# Patient Record
Sex: MALE | Race: WHITE | NOT HISPANIC OR LATINO | Employment: OTHER | RURAL
[De-identification: names, ages, dates, MRNs, and addresses within clinical notes are randomized per-mention and may not be internally consistent; named-entity substitution may affect disease eponyms.]

---

## 2021-10-13 ENCOUNTER — OFFICE VISIT (OUTPATIENT)
Dept: FAMILY MEDICINE | Facility: CLINIC | Age: 41
End: 2021-10-13
Payer: MEDICAID

## 2021-10-13 VITALS
TEMPERATURE: 97 F | SYSTOLIC BLOOD PRESSURE: 180 MMHG | WEIGHT: 240 LBS | OXYGEN SATURATION: 95 % | HEIGHT: 69 IN | HEART RATE: 103 BPM | DIASTOLIC BLOOD PRESSURE: 120 MMHG | BODY MASS INDEX: 35.55 KG/M2

## 2021-10-13 DIAGNOSIS — I10 PRIMARY HYPERTENSION: Primary | ICD-10-CM

## 2021-10-13 DIAGNOSIS — Z11.52 ENCOUNTER FOR SCREENING LABORATORY TESTING FOR COVID-19 VIRUS: ICD-10-CM

## 2021-10-13 LAB
CTP QC/QA: YES
SARS-COV-2 AG RESP QL IA.RAPID: NEGATIVE

## 2021-10-13 PROCEDURE — 87426 SARSCOV CORONAVIRUS AG IA: CPT | Mod: RHCUB | Performed by: FAMILY MEDICINE

## 2021-10-13 PROCEDURE — 99203 OFFICE O/P NEW LOW 30 MIN: CPT | Mod: ,,, | Performed by: FAMILY MEDICINE

## 2021-10-13 PROCEDURE — 99203 PR OFFICE/OUTPT VISIT, NEW, LEVL III, 30-44 MIN: ICD-10-PCS | Mod: ,,, | Performed by: FAMILY MEDICINE

## 2021-10-13 RX ORDER — LISINOPRIL 10 MG/1
10 TABLET ORAL DAILY
Qty: 30 TABLET | Refills: 5 | Status: SHIPPED | OUTPATIENT
Start: 2021-10-13 | End: 2022-02-28

## 2021-10-13 RX ORDER — PROMETHAZINE HYDROCHLORIDE 25 MG/1
25 TABLET ORAL EVERY 6 HOURS PRN
Qty: 20 TABLET | Refills: 0 | Status: SHIPPED | OUTPATIENT
Start: 2021-10-13 | End: 2024-03-26

## 2021-10-13 RX ORDER — DEXTROAMPHETAMINE SACCHARATE, AMPHETAMINE ASPARTATE MONOHYDRATE, DEXTROAMPHETAMINE SULFATE AND AMPHETAMINE SULFATE 3.75; 3.75; 3.75; 3.75 MG/1; MG/1; MG/1; MG/1
15 CAPSULE, EXTENDED RELEASE ORAL 4 TIMES DAILY
COMMUNITY
End: 2024-03-26

## 2022-02-28 ENCOUNTER — OFFICE VISIT (OUTPATIENT)
Dept: FAMILY MEDICINE | Facility: CLINIC | Age: 42
End: 2022-02-28
Payer: MEDICAID

## 2022-02-28 VITALS
BODY MASS INDEX: 41.03 KG/M2 | OXYGEN SATURATION: 95 % | HEIGHT: 69 IN | WEIGHT: 277 LBS | SYSTOLIC BLOOD PRESSURE: 153 MMHG | HEART RATE: 83 BPM | DIASTOLIC BLOOD PRESSURE: 101 MMHG | TEMPERATURE: 98 F | RESPIRATION RATE: 20 BRPM

## 2022-02-28 DIAGNOSIS — D17.9 LIPOMA, UNSPECIFIED SITE: ICD-10-CM

## 2022-02-28 DIAGNOSIS — R53.83 FATIGUE, UNSPECIFIED TYPE: Primary | ICD-10-CM

## 2022-02-28 DIAGNOSIS — I10 PRIMARY HYPERTENSION: ICD-10-CM

## 2022-02-28 DIAGNOSIS — Z79.899 ENCOUNTER FOR LONG-TERM (CURRENT) USE OF MEDICATIONS: ICD-10-CM

## 2022-02-28 LAB
ALBUMIN SERPL BCP-MCNC: 3.8 G/DL (ref 3.5–5)
ALBUMIN/GLOB SERPL: 0.9 {RATIO}
ALP SERPL-CCNC: 66 U/L (ref 45–115)
ALT SERPL W P-5'-P-CCNC: 55 U/L (ref 16–61)
ANION GAP SERPL CALCULATED.3IONS-SCNC: 10 MMOL/L (ref 7–16)
AST SERPL W P-5'-P-CCNC: 21 U/L (ref 15–37)
BASOPHILS # BLD AUTO: 0.07 K/UL (ref 0–0.2)
BASOPHILS NFR BLD AUTO: 0.8 % (ref 0–1)
BILIRUB SERPL-MCNC: 0.3 MG/DL (ref 0–1.2)
BUN SERPL-MCNC: 21 MG/DL (ref 7–18)
BUN/CREAT SERPL: 20 (ref 6–20)
CALCIUM SERPL-MCNC: 9.1 MG/DL (ref 8.5–10.1)
CHLORIDE SERPL-SCNC: 109 MMOL/L (ref 98–107)
CHOLEST SERPL-MCNC: 199 MG/DL (ref 0–200)
CHOLEST/HDLC SERPL: 4.9 {RATIO}
CO2 SERPL-SCNC: 23 MMOL/L (ref 21–32)
CREAT SERPL-MCNC: 1.06 MG/DL (ref 0.7–1.3)
DIFFERENTIAL METHOD BLD: ABNORMAL
EOSINOPHIL # BLD AUTO: 0.24 K/UL (ref 0–0.5)
EOSINOPHIL NFR BLD AUTO: 2.9 % (ref 1–4)
ERYTHROCYTE [DISTWIDTH] IN BLOOD BY AUTOMATED COUNT: 14.2 % (ref 11.5–14.5)
FOLATE SERPL-MCNC: >20 NG/ML (ref 3.1–17.5)
GLOBULIN SER-MCNC: 4.2 G/DL (ref 2–4)
GLUCOSE SERPL-MCNC: 107 MG/DL (ref 74–106)
HCT VFR BLD AUTO: 44.1 % (ref 40–54)
HDLC SERPL-MCNC: 41 MG/DL (ref 40–60)
HGB BLD-MCNC: 14.2 G/DL (ref 13.5–18)
IMM GRANULOCYTES # BLD AUTO: 0.01 K/UL (ref 0–0.04)
IMM GRANULOCYTES NFR BLD: 0.1 % (ref 0–0.4)
LDLC SERPL CALC-MCNC: 136 MG/DL
LDLC/HDLC SERPL: 3.3 {RATIO}
LYMPHOCYTES # BLD AUTO: 2.42 K/UL (ref 1–4.8)
LYMPHOCYTES NFR BLD AUTO: 28.8 % (ref 27–41)
MCH RBC QN AUTO: 27.5 PG (ref 27–31)
MCHC RBC AUTO-ENTMCNC: 32.2 G/DL (ref 32–36)
MCV RBC AUTO: 85.5 FL (ref 80–96)
MONOCYTES # BLD AUTO: 0.7 K/UL (ref 0–0.8)
MONOCYTES NFR BLD AUTO: 8.3 % (ref 2–6)
MPC BLD CALC-MCNC: 10 FL (ref 9.4–12.4)
NEUTROPHILS # BLD AUTO: 4.96 K/UL (ref 1.8–7.7)
NEUTROPHILS NFR BLD AUTO: 59.1 % (ref 53–65)
NONHDLC SERPL-MCNC: 158 MG/DL
NRBC # BLD AUTO: 0 X10E3/UL
NRBC, AUTO (.00): 0 %
PLATELET # BLD AUTO: 343 K/UL (ref 150–400)
POTASSIUM SERPL-SCNC: 4.4 MMOL/L (ref 3.5–5.1)
PROT SERPL-MCNC: 8 G/DL (ref 6.4–8.2)
RBC # BLD AUTO: 5.16 M/UL (ref 4.6–6.2)
SODIUM SERPL-SCNC: 138 MMOL/L (ref 136–145)
TESTOST SERPL-MCNC: 242 NG/DL (ref 240–950)
TRIGL SERPL-MCNC: 109 MG/DL (ref 35–150)
TSH SERPL DL<=0.005 MIU/L-ACNC: 1.32 UIU/ML (ref 0.36–3.74)
VIT B12 SERPL-MCNC: 490 PG/ML (ref 193–986)
VLDLC SERPL-MCNC: 22 MG/DL
WBC # BLD AUTO: 8.4 K/UL (ref 4.5–11)

## 2022-02-28 PROCEDURE — 85025 CBC WITH DIFFERENTIAL: ICD-10-PCS | Mod: ,,, | Performed by: CLINICAL MEDICAL LABORATORY

## 2022-02-28 PROCEDURE — 99214 PR OFFICE/OUTPT VISIT, EST, LEVL IV, 30-39 MIN: ICD-10-PCS | Mod: GC,,, | Performed by: SPECIALIST

## 2022-02-28 PROCEDURE — 80061 LIPID PANEL: ICD-10-PCS | Mod: ,,, | Performed by: CLINICAL MEDICAL LABORATORY

## 2022-02-28 PROCEDURE — 80053 COMPREHEN METABOLIC PANEL: CPT | Mod: ,,, | Performed by: CLINICAL MEDICAL LABORATORY

## 2022-02-28 PROCEDURE — 99214 OFFICE O/P EST MOD 30 MIN: CPT | Mod: GC,,, | Performed by: SPECIALIST

## 2022-02-28 PROCEDURE — 80053 COMPREHENSIVE METABOLIC PANEL: ICD-10-PCS | Mod: ,,, | Performed by: CLINICAL MEDICAL LABORATORY

## 2022-02-28 PROCEDURE — 82746 VITAMIN B12/FOLATE, SERUM PANEL: ICD-10-PCS | Mod: ,,, | Performed by: CLINICAL MEDICAL LABORATORY

## 2022-02-28 PROCEDURE — 80061 LIPID PANEL: CPT | Mod: ,,, | Performed by: CLINICAL MEDICAL LABORATORY

## 2022-02-28 PROCEDURE — 82746 ASSAY OF FOLIC ACID SERUM: CPT | Mod: ,,, | Performed by: CLINICAL MEDICAL LABORATORY

## 2022-02-28 PROCEDURE — 82607 VITAMIN B12/FOLATE, SERUM PANEL: ICD-10-PCS | Mod: ,,, | Performed by: CLINICAL MEDICAL LABORATORY

## 2022-02-28 PROCEDURE — 85025 COMPLETE CBC W/AUTO DIFF WBC: CPT | Mod: ,,, | Performed by: CLINICAL MEDICAL LABORATORY

## 2022-02-28 PROCEDURE — 84403 TESTOSTERONE: ICD-10-PCS | Mod: ,,, | Performed by: CLINICAL MEDICAL LABORATORY

## 2022-02-28 PROCEDURE — 82607 VITAMIN B-12: CPT | Mod: ,,, | Performed by: CLINICAL MEDICAL LABORATORY

## 2022-02-28 PROCEDURE — 84443 ASSAY THYROID STIM HORMONE: CPT | Mod: ,,, | Performed by: CLINICAL MEDICAL LABORATORY

## 2022-02-28 PROCEDURE — 84443 TSH: ICD-10-PCS | Mod: ,,, | Performed by: CLINICAL MEDICAL LABORATORY

## 2022-02-28 PROCEDURE — 84403 ASSAY OF TOTAL TESTOSTERONE: CPT | Mod: ,,, | Performed by: CLINICAL MEDICAL LABORATORY

## 2022-02-28 RX ORDER — LISINOPRIL 20 MG/1
20 TABLET ORAL DAILY
Qty: 30 TABLET | Refills: 3 | Status: SHIPPED | OUTPATIENT
Start: 2022-02-28 | End: 2024-03-26

## 2022-03-01 NOTE — ASSESSMENT & PLAN NOTE
Current BP of 153/101. Patient was started on lisinopril 10mg in October, he states that his BP was >200/110 at that time.  - Increase lisinopril to 20mg  - Instructed patient to keep BP log until next visit, checking at least twice per day.

## 2022-03-01 NOTE — PROGRESS NOTES
"      Allen Saul, DO  [address], [place] [address]  Phone: [phone]     Subjective     Name: Jadon Hussein   YOB: 1980 (41 y.o.)  MRN: [id number]  Visit Date: 02/28/2022   Chief Complaint: Establish Care and Fatigue        HISTORY OF PRESENT ILLNESS:  Pt is a 40 yo male presenting today with a cc of "Fatigue, HTN, and lipoma." He states that the fatigue began in around the beginning of October after having his adderall prescription decreased 2/2 HTN. At that time he was also started on Lisinopril 10mg. He states that he is tired throughout the day, wakes up multiple times throughout the night, and never seems to get enough rest. His wife also states that he snores heavily and describes some apneic like episodes. He states that he has had a sleep study previously but that it was many years ago, around his 20's. Patient also states that his blood pressure has remained elevated even with the 10mg lisinopril, he states that his BP has never been in the 120/80s even as a teenager. Lastly he states that he has what he believes is a lipoma on his right shoulder, he first noticed it several years ago but states that it has grown drastically in size over the course of the last 1-2 years, it causes him no pain. He has no other acute complaints.        PAST MEDICAL HISTORY:  Significant Diagnoses - Patient  has a past medical history of ADHD (attention deficit hyperactivity disorder) and Hypertension.  Medications - Patient has a current medication list which includes the following long-term medication(s): dextroamphetamine-amphetamine and lisinopril.   Allergies - Patient has No Known Allergies.  Surgeries - Patient  has no past surgical history on file.  Family History - Patient family history is not on file.      SOCIAL HISTORY:  Tobacco - Patient  reports that he has quit smoking. He has never used smokeless tobacco.   Alcohol - Patient  reports previous alcohol use.   Recreational " "Drugs - Patient  has no history on file for drug use.       Review of Systems   Constitutional: Negative for chills, diaphoresis, fatigue and fever.   Respiratory: Negative for cough, chest tightness, shortness of breath and wheezing.    Cardiovascular: Negative for chest pain, palpitations and leg swelling.   Gastrointestinal: Negative for abdominal pain, constipation, diarrhea, nausea and vomiting.   Integumentary:         Describes lipoma of right shoulder   Neurological: Positive for disturbances in coordination and coordination difficulties. Negative for dizziness, syncope, weakness, light-headedness, numbness and headaches.   Psychiatric/Behavioral: Positive for sleep disturbance.   All other systems reviewed and are negative.         Past Medical History:   Diagnosis Date    ADHD (attention deficit hyperactivity disorder)     Hypertension         Review of patient's allergies indicates:  No Known Allergies     History reviewed. No pertinent surgical history.     History reviewed. No pertinent family history.    Current Outpatient Medications   Medication Instructions    dextroamphetamine-amphetamine (ADDERALL XR) 15 MG 24 hr capsule 15 mg, Oral, 4 times daily    lisinopriL (PRINIVIL,ZESTRIL) 20 mg, Oral, Daily    promethazine (PHENERGAN) 25 mg, Oral, Every 6 hours PRN        Objective     BP (!) 153/101 (BP Location: Right arm, Patient Position: Sitting)   Pulse 83   Temp 97.8 °F (36.6 °C) (Temporal)   Resp 20   Ht 5' 9" (1.753 m)   Wt 125.6 kg (277 lb)   SpO2 95%   BMI 40.91 kg/m²     Physical Exam  Vitals and nursing note reviewed.   Constitutional:       General: He is not in acute distress.     Appearance: Normal appearance. He is obese. He is not ill-appearing.   HENT:      Head: Normocephalic and atraumatic.      Nose: Nose normal. No congestion or rhinorrhea.      Mouth/Throat:      Mouth: Mucous membranes are moist.      Pharynx: Oropharynx is clear. No oropharyngeal exudate or posterior " oropharyngeal erythema.   Eyes:      General: No scleral icterus.     Extraocular Movements: Extraocular movements intact.      Conjunctiva/sclera: Conjunctivae normal.      Pupils: Pupils are equal, round, and reactive to light.   Cardiovascular:      Rate and Rhythm: Normal rate and regular rhythm.      Pulses: Normal pulses.      Heart sounds: Normal heart sounds. No murmur heard.    No friction rub. No gallop.   Pulmonary:      Effort: Pulmonary effort is normal. No respiratory distress.      Breath sounds: Normal breath sounds. No wheezing, rhonchi or rales.   Abdominal:      General: Abdomen is flat. Bowel sounds are normal. There is no distension.      Palpations: Abdomen is soft.      Tenderness: There is no abdominal tenderness. There is no guarding or rebound.   Musculoskeletal:         General: Normal range of motion.        Arms:       Cervical back: Normal range of motion and neck supple.      Right lower leg: No edema.      Left lower leg: No edema.   Skin:     General: Skin is warm and dry.      Coloration: Skin is not jaundiced.   Neurological:      General: No focal deficit present.      Mental Status: He is alert and oriented to person, place, and time. Mental status is at baseline.   Psychiatric:         Mood and Affect: Mood normal.         Behavior: Behavior normal.         Thought Content: Thought content normal.         Judgment: Judgment normal.          All recently obtained labs have been reviewed and discussed in detail with the patient.   Assessment     1. Fatigue, unspecified type    2. Lipoma, unspecified site    3. Primary hypertension    4. Encounter for long-term (current) use of medications         Plan        Problem List Items Addressed This Visit        Cardiac/Vascular    Hypertension     Current BP of 153/101. Patient was started on lisinopril 10mg in October, he states that his BP was >200/110 at that time.  - Increase lisinopril to 20mg  - Instructed patient to keep BP log  until next visit, checking at least twice per day.           Relevant Medications    lisinopriL (PRINIVIL,ZESTRIL) 20 MG tablet       Oncology    Lipoma    Relevant Orders    Ambulatory referral/consult to General Surgery       Other    Fatigue - Primary    Relevant Orders    TSH    Vitamin B12 & Folate    Testosterone    Ambulatory referral/consult to Sleep Disorders      Other Visit Diagnoses     Encounter for long-term (current) use of medications        Relevant Orders    Comprehensive Metabolic Panel    CBC Auto Differential    TSH    Vitamin B12 & Folate    Testosterone    Lipid Panel          Follow up in about 1 month (around 3/28/2022), or if symptoms worsen or fail to improve.    Allen Saul,    [employer]

## 2022-06-20 ENCOUNTER — HOSPITAL ENCOUNTER (OUTPATIENT)
Dept: CARDIOLOGY | Facility: HOSPITAL | Age: 42
Discharge: HOME | End: 2022-06-20
Payer: MEDICAID

## 2022-06-20 DIAGNOSIS — F90.0 ADHD, PREDOMINANTLY INATTENTIVE TYPE: ICD-10-CM

## 2022-06-20 PROCEDURE — 93010 EKG 12-LEAD: ICD-10-PCS | Mod: ,,, | Performed by: INTERNAL MEDICINE

## 2022-06-20 PROCEDURE — 93010 ELECTROCARDIOGRAM REPORT: CPT | Mod: ,,, | Performed by: INTERNAL MEDICINE

## 2022-06-20 PROCEDURE — 93005 ELECTROCARDIOGRAM TRACING: CPT

## 2024-03-26 ENCOUNTER — HOSPITAL ENCOUNTER (OUTPATIENT)
Facility: HOSPITAL | Age: 44
Discharge: HOME | End: 2024-03-27
Attending: EMERGENCY MEDICINE | Admitting: SURGERY

## 2024-03-26 DIAGNOSIS — K80.20 CALCULUS OF GALLBLADDER WITHOUT CHOLECYSTITIS WITHOUT OBSTRUCTION: ICD-10-CM

## 2024-03-26 DIAGNOSIS — I10 PRIMARY HYPERTENSION: ICD-10-CM

## 2024-03-26 DIAGNOSIS — R10.13 EPIGASTRIC PAIN: ICD-10-CM

## 2024-03-26 DIAGNOSIS — R07.9 CHEST PAIN: ICD-10-CM

## 2024-03-26 DIAGNOSIS — K80.00 CALCULUS OF GALLBLADDER WITH ACUTE CHOLECYSTITIS WITHOUT OBSTRUCTION: Primary | ICD-10-CM

## 2024-03-26 LAB
ALBUMIN SERPL BCP-MCNC: 3.9 G/DL (ref 3.5–5)
ALBUMIN/GLOB SERPL: 0.8 {RATIO}
ALP SERPL-CCNC: 89 U/L (ref 45–115)
ALT SERPL W P-5'-P-CCNC: 37 U/L (ref 16–61)
ANION GAP SERPL CALCULATED.3IONS-SCNC: 12 MMOL/L (ref 7–16)
AST SERPL W P-5'-P-CCNC: 12 U/L (ref 15–37)
BASOPHILS # BLD AUTO: 0.07 K/UL (ref 0–0.2)
BASOPHILS NFR BLD AUTO: 0.6 % (ref 0–1)
BILIRUB SERPL-MCNC: 0.4 MG/DL (ref ?–1.2)
BILIRUB UR QL STRIP: NEGATIVE
BUN SERPL-MCNC: 21 MG/DL (ref 7–18)
BUN/CREAT SERPL: 17 (ref 6–20)
CALCIUM SERPL-MCNC: 9.9 MG/DL (ref 8.5–10.1)
CHLORIDE SERPL-SCNC: 102 MMOL/L (ref 98–107)
CLARITY UR: CLEAR
CO2 SERPL-SCNC: 28 MMOL/L (ref 21–32)
COLOR UR: COLORLESS
CREAT SERPL-MCNC: 1.27 MG/DL (ref 0.7–1.3)
DIFFERENTIAL METHOD BLD: ABNORMAL
EGFR (NO RACE VARIABLE) (RUSH/TITUS): 72 ML/MIN/1.73M2
EOSINOPHIL # BLD AUTO: 0.11 K/UL (ref 0–0.5)
EOSINOPHIL NFR BLD AUTO: 0.9 % (ref 1–4)
ERYTHROCYTE [DISTWIDTH] IN BLOOD BY AUTOMATED COUNT: 13.4 % (ref 11.5–14.5)
GLOBULIN SER-MCNC: 4.7 G/DL (ref 2–4)
GLUCOSE SERPL-MCNC: 109 MG/DL (ref 74–106)
GLUCOSE UR STRIP-MCNC: NORMAL MG/DL
HCT VFR BLD AUTO: 47.4 % (ref 40–54)
HGB BLD-MCNC: 15.1 G/DL (ref 13.5–18)
IMM GRANULOCYTES # BLD AUTO: 0.04 K/UL (ref 0–0.04)
IMM GRANULOCYTES NFR BLD: 0.3 % (ref 0–0.4)
KETONES UR STRIP-SCNC: NEGATIVE MG/DL
LEUKOCYTE ESTERASE UR QL STRIP: NEGATIVE
LIPASE SERPL-CCNC: 30 U/L (ref 16–77)
LYMPHOCYTES # BLD AUTO: 2.38 K/UL (ref 1–4.8)
LYMPHOCYTES NFR BLD AUTO: 20.3 % (ref 27–41)
MCH RBC QN AUTO: 26.7 PG (ref 27–31)
MCHC RBC AUTO-ENTMCNC: 31.9 G/DL (ref 32–36)
MCV RBC AUTO: 83.7 FL (ref 80–96)
MONOCYTES # BLD AUTO: 0.92 K/UL (ref 0–0.8)
MONOCYTES NFR BLD AUTO: 7.8 % (ref 2–6)
MPC BLD CALC-MCNC: 10.2 FL (ref 9.4–12.4)
MUCOUS, UA: ABNORMAL /LPF
NEUTROPHILS # BLD AUTO: 8.21 K/UL (ref 1.8–7.7)
NEUTROPHILS NFR BLD AUTO: 70.1 % (ref 53–65)
NITRITE UR QL STRIP: NEGATIVE
NRBC # BLD AUTO: 0 X10E3/UL
NRBC, AUTO (.00): 0 %
PH UR STRIP: 6 PH UNITS
PLATELET # BLD AUTO: 352 K/UL (ref 150–400)
POTASSIUM SERPL-SCNC: 3.7 MMOL/L (ref 3.5–5.1)
PROT SERPL-MCNC: 8.6 G/DL (ref 6.4–8.2)
PROT UR QL STRIP: 30
RBC # BLD AUTO: 5.66 M/UL (ref 4.6–6.2)
RBC # UR STRIP: ABNORMAL /UL
RBC #/AREA URNS HPF: 7 /HPF
SODIUM SERPL-SCNC: 138 MMOL/L (ref 136–145)
SP GR UR STRIP: 1.04
SQUAMOUS #/AREA URNS LPF: ABNORMAL /HPF
TROPONIN I SERPL DL<=0.01 NG/ML-MCNC: 34.4 PG/ML
TROPONIN I SERPL DL<=0.01 NG/ML-MCNC: 45 PG/ML
UROBILINOGEN UR STRIP-ACNC: NORMAL MG/DL
WBC # BLD AUTO: 11.73 K/UL (ref 4.5–11)
WBC #/AREA URNS HPF: 3 /HPF

## 2024-03-26 PROCEDURE — 96365 THER/PROPH/DIAG IV INF INIT: CPT

## 2024-03-26 PROCEDURE — 96374 THER/PROPH/DIAG INJ IV PUSH: CPT | Mod: 59

## 2024-03-26 PROCEDURE — 80053 COMPREHEN METABOLIC PANEL: CPT | Performed by: GENERAL PRACTICE

## 2024-03-26 PROCEDURE — 93005 ELECTROCARDIOGRAM TRACING: CPT

## 2024-03-26 PROCEDURE — G0378 HOSPITAL OBSERVATION PER HR: HCPCS

## 2024-03-26 PROCEDURE — 96375 TX/PRO/DX INJ NEW DRUG ADDON: CPT

## 2024-03-26 PROCEDURE — 25500020 PHARM REV CODE 255: Performed by: EMERGENCY MEDICINE

## 2024-03-26 PROCEDURE — 84484 ASSAY OF TROPONIN QUANT: CPT | Performed by: GENERAL PRACTICE

## 2024-03-26 PROCEDURE — 63600175 PHARM REV CODE 636 W HCPCS: Performed by: GENERAL PRACTICE

## 2024-03-26 PROCEDURE — 63600175 PHARM REV CODE 636 W HCPCS: Performed by: EMERGENCY MEDICINE

## 2024-03-26 PROCEDURE — 99285 EMERGENCY DEPT VISIT HI MDM: CPT | Mod: 25

## 2024-03-26 PROCEDURE — 96361 HYDRATE IV INFUSION ADD-ON: CPT

## 2024-03-26 PROCEDURE — 93010 ELECTROCARDIOGRAM REPORT: CPT | Mod: ,,, | Performed by: INTERNAL MEDICINE

## 2024-03-26 PROCEDURE — 63600175 PHARM REV CODE 636 W HCPCS: Performed by: SURGERY

## 2024-03-26 PROCEDURE — 85025 COMPLETE CBC W/AUTO DIFF WBC: CPT | Performed by: GENERAL PRACTICE

## 2024-03-26 PROCEDURE — 99285 EMERGENCY DEPT VISIT HI MDM: CPT | Mod: GC,,, | Performed by: EMERGENCY MEDICINE

## 2024-03-26 PROCEDURE — 25000003 PHARM REV CODE 250: Performed by: GENERAL PRACTICE

## 2024-03-26 PROCEDURE — 25000003 PHARM REV CODE 250: Performed by: SURGERY

## 2024-03-26 PROCEDURE — 84484 ASSAY OF TROPONIN QUANT: CPT | Mod: 91 | Performed by: EMERGENCY MEDICINE

## 2024-03-26 PROCEDURE — 63600175 PHARM REV CODE 636 W HCPCS: Mod: JZ,JG | Performed by: SURGERY

## 2024-03-26 PROCEDURE — 99221 1ST HOSP IP/OBS SF/LOW 40: CPT | Mod: 57,,, | Performed by: SURGERY

## 2024-03-26 PROCEDURE — 96376 TX/PRO/DX INJ SAME DRUG ADON: CPT

## 2024-03-26 PROCEDURE — 81003 URINALYSIS AUTO W/O SCOPE: CPT | Performed by: EMERGENCY MEDICINE

## 2024-03-26 PROCEDURE — 83690 ASSAY OF LIPASE: CPT | Performed by: GENERAL PRACTICE

## 2024-03-26 RX ORDER — ACETAMINOPHEN 325 MG/1
650 TABLET ORAL EVERY 4 HOURS PRN
Status: DISCONTINUED | OUTPATIENT
Start: 2024-03-26 | End: 2024-03-27 | Stop reason: HOSPADM

## 2024-03-26 RX ORDER — ACETAMINOPHEN 325 MG/1
650 TABLET ORAL EVERY 8 HOURS PRN
Status: DISCONTINUED | OUTPATIENT
Start: 2024-03-26 | End: 2024-03-27 | Stop reason: HOSPADM

## 2024-03-26 RX ORDER — HYDROMORPHONE HYDROCHLORIDE 2 MG/ML
2 INJECTION, SOLUTION INTRAMUSCULAR; INTRAVENOUS; SUBCUTANEOUS
Status: COMPLETED | OUTPATIENT
Start: 2024-03-26 | End: 2024-03-26

## 2024-03-26 RX ORDER — ONDANSETRON HYDROCHLORIDE 2 MG/ML
4 INJECTION, SOLUTION INTRAVENOUS
Status: DISCONTINUED | OUTPATIENT
Start: 2024-03-26 | End: 2024-03-26

## 2024-03-26 RX ORDER — HYDRALAZINE HYDROCHLORIDE 20 MG/ML
20 INJECTION INTRAMUSCULAR; INTRAVENOUS
Status: COMPLETED | OUTPATIENT
Start: 2024-03-26 | End: 2024-03-26

## 2024-03-26 RX ORDER — ONDANSETRON HYDROCHLORIDE 2 MG/ML
4 INJECTION, SOLUTION INTRAVENOUS EVERY 12 HOURS PRN
Status: DISCONTINUED | OUTPATIENT
Start: 2024-03-26 | End: 2024-03-27 | Stop reason: HOSPADM

## 2024-03-26 RX ORDER — MORPHINE SULFATE 2 MG/ML
6 INJECTION, SOLUTION INTRAMUSCULAR; INTRAVENOUS EVERY 4 HOURS PRN
Status: DISCONTINUED | OUTPATIENT
Start: 2024-03-26 | End: 2024-03-27

## 2024-03-26 RX ORDER — SODIUM CHLORIDE 450 MG/100ML
INJECTION, SOLUTION INTRAVENOUS CONTINUOUS
Status: DISCONTINUED | OUTPATIENT
Start: 2024-03-26 | End: 2024-03-27 | Stop reason: HOSPADM

## 2024-03-26 RX ORDER — LABETALOL HYDROCHLORIDE 5 MG/ML
20 INJECTION, SOLUTION INTRAVENOUS
Status: COMPLETED | OUTPATIENT
Start: 2024-03-26 | End: 2024-03-26

## 2024-03-26 RX ORDER — ONDANSETRON 4 MG/1
4 TABLET, ORALLY DISINTEGRATING ORAL
Status: COMPLETED | OUTPATIENT
Start: 2024-03-26 | End: 2024-03-26

## 2024-03-26 RX ORDER — HYDROMORPHONE HYDROCHLORIDE 2 MG/ML
1 INJECTION, SOLUTION INTRAMUSCULAR; INTRAVENOUS; SUBCUTANEOUS
Status: COMPLETED | OUTPATIENT
Start: 2024-03-26 | End: 2024-03-26

## 2024-03-26 RX ORDER — HYDRALAZINE HYDROCHLORIDE 20 MG/ML
10 INJECTION INTRAMUSCULAR; INTRAVENOUS EVERY 6 HOURS PRN
Status: DISCONTINUED | OUTPATIENT
Start: 2024-03-26 | End: 2024-03-27 | Stop reason: HOSPADM

## 2024-03-26 RX ORDER — ONDANSETRON 4 MG/1
4 TABLET, ORALLY DISINTEGRATING ORAL
Status: DISCONTINUED | OUTPATIENT
Start: 2024-03-26 | End: 2024-03-26

## 2024-03-26 RX ORDER — HYDRALAZINE HYDROCHLORIDE 20 MG/ML
10 INJECTION INTRAMUSCULAR; INTRAVENOUS
Status: COMPLETED | OUTPATIENT
Start: 2024-03-26 | End: 2024-03-26

## 2024-03-26 RX ORDER — KETOROLAC TROMETHAMINE 30 MG/ML
30 INJECTION, SOLUTION INTRAMUSCULAR; INTRAVENOUS
Status: COMPLETED | OUTPATIENT
Start: 2024-03-26 | End: 2024-03-26

## 2024-03-26 RX ORDER — LISINOPRIL 20 MG/1
20 TABLET ORAL DAILY
Status: DISCONTINUED | OUTPATIENT
Start: 2024-03-27 | End: 2024-03-27 | Stop reason: HOSPADM

## 2024-03-26 RX ORDER — HYDROCODONE BITARTRATE AND ACETAMINOPHEN 5; 325 MG/1; MG/1
1 TABLET ORAL EVERY 4 HOURS PRN
Status: DISCONTINUED | OUTPATIENT
Start: 2024-03-26 | End: 2024-03-27 | Stop reason: HOSPADM

## 2024-03-26 RX ADMIN — LABETALOL HYDROCHLORIDE 20 MG: 5 INJECTION, SOLUTION INTRAVENOUS at 07:03

## 2024-03-26 RX ADMIN — HYDROMORPHONE HYDROCHLORIDE 1 MG: 2 INJECTION INTRAMUSCULAR; INTRAVENOUS; SUBCUTANEOUS at 04:03

## 2024-03-26 RX ADMIN — IOPAMIDOL 100 ML: 755 INJECTION, SOLUTION INTRAVENOUS at 04:03

## 2024-03-26 RX ADMIN — HYDRALAZINE HYDROCHLORIDE 20 MG: 20 INJECTION INTRAMUSCULAR; INTRAVENOUS at 05:03

## 2024-03-26 RX ADMIN — ONDANSETRON 4 MG: 4 TABLET, ORALLY DISINTEGRATING ORAL at 03:03

## 2024-03-26 RX ADMIN — PIPERACILLIN AND TAZOBACTAM 4.5 G: 4; .5 INJECTION, POWDER, FOR SOLUTION INTRAVENOUS; PARENTERAL at 08:03

## 2024-03-26 RX ADMIN — KETOROLAC TROMETHAMINE 30 MG: 30 INJECTION, SOLUTION INTRAMUSCULAR at 04:03

## 2024-03-26 RX ADMIN — HYDRALAZINE HYDROCHLORIDE 10 MG: 20 INJECTION, SOLUTION INTRAMUSCULAR; INTRAVENOUS at 03:03

## 2024-03-26 RX ADMIN — MORPHINE SULFATE 6 MG: 2 INJECTION, SOLUTION INTRAMUSCULAR; INTRAVENOUS at 11:03

## 2024-03-26 RX ADMIN — HYDROMORPHONE HYDROCHLORIDE 1 MG: 2 INJECTION INTRAMUSCULAR; INTRAVENOUS; SUBCUTANEOUS at 03:03

## 2024-03-26 RX ADMIN — HYDROMORPHONE HYDROCHLORIDE 2 MG: 2 INJECTION INTRAMUSCULAR; INTRAVENOUS; SUBCUTANEOUS at 05:03

## 2024-03-26 RX ADMIN — HYDROMORPHONE HYDROCHLORIDE 2 MG: 2 INJECTION INTRAMUSCULAR; INTRAVENOUS; SUBCUTANEOUS at 07:03

## 2024-03-26 RX ADMIN — SODIUM CHLORIDE: 4.5 INJECTION, SOLUTION INTRAVENOUS at 08:03

## 2024-03-26 NOTE — ED PROVIDER NOTES
Encounter Date: 3/26/2024       History     Chief Complaint   Patient presents with    Chest Pain    Abdominal Pain     Patient is a 45 y/o male who presents to the ED with complaint of chest and abdominal pain worsening today. Patient reports having abdominal pain across the upper abdomen with radiation to his chest and back. Patient reports having similar pain in the past. He reports drinking alcohol on and off and last time he drank was 2 days ago. Patient reports history of HTN and ADHD but has not taken any medication since ran out of medication refills and has not seen a doctor is a long time. He denies history of CAD, DM, or pancreatitis. Patient denies fever, chills, nausea, vomiting, SOB, urinary or bowel habit changes.      Review of patient's allergies indicates:  No Known Allergies  Past Medical History:   Diagnosis Date    ADHD (attention deficit hyperactivity disorder)     Hypertension      Past Surgical History:   Procedure Laterality Date    LAPAROSCOPIC CHOLECYSTECTOMY N/A 3/27/2024    Procedure: CHOLECYSTECTOMY, LAPAROSCOPIC;  Surgeon: Misha Tejada MD;  Location: [facility];  Service: General;  Laterality: N/A;     History reviewed. No pertinent family history.  Social History     Tobacco Use    Smoking status: Former    Smokeless tobacco: Never   Substance Use Topics    Alcohol use: Not Currently     Review of Systems   Constitutional:  Positive for diaphoresis. Negative for chills and fever.   HENT:  Negative for congestion and rhinorrhea.    Respiratory:  Negative for shortness of breath.    Cardiovascular:  Positive for chest pain. Negative for leg swelling.   Gastrointestinal:  Positive for abdominal pain. Negative for constipation, diarrhea, nausea and vomiting.   Genitourinary:  Negative for dysuria.   Musculoskeletal:  Negative for back pain.   Skin:  Negative for rash.   Neurological:  Negative for weakness and headaches.   Hematological:  Does not bruise/bleed easily.    Psychiatric/Behavioral:  Negative for agitation. The patient is not nervous/anxious.        Physical Exam     Initial Vitals [03/26/24 1412]   BP Pulse Resp Temp SpO2   (!) 220/195 76 (!) 24 97.5 °F (36.4 °C) 98 %      MAP       --         Physical Exam    Vitals reviewed.  Constitutional: He appears well-developed and well-nourished. He is not diaphoretic. No distress.   HENT:   Head: Normocephalic and atraumatic.   Eyes: Conjunctivae and EOM are normal. Pupils are equal, round, and reactive to light.   Neck: Neck supple.   Normal range of motion.  Cardiovascular:  Normal rate and regular rhythm.           No murmur heard.  Pulmonary/Chest: No respiratory distress. He has no wheezes. He has no rhonchi. He has no rales.   Abdominal: He exhibits no distension. There is abdominal tenderness (across upper abdomen). There is no rebound and no guarding.   Musculoskeletal:         General: Normal range of motion.      Cervical back: Normal range of motion and neck supple.     Neurological: He is alert and oriented to person, place, and time. He displays normal reflexes. No cranial nerve deficit. GCS score is 15. GCS eye subscore is 4. GCS verbal subscore is 5. GCS motor subscore is 6.   Skin: Skin is warm. Capillary refill takes less than 2 seconds. No erythema.   Psychiatric: He has a normal mood and affect. Thought content normal.         Medical Screening Exam   See Full Note    ED Course   Procedures  Labs Reviewed   COMPREHENSIVE METABOLIC PANEL - Abnormal; Notable for the following components:       Result Value    Glucose 109 (*)     BUN 21 (*)     Total Protein 8.6 (*)     Globulin 4.7 (*)     AST 12 (*)     All other components within normal limits   CBC WITH DIFFERENTIAL - Abnormal; Notable for the following components:    WBC 11.73 (*)     MCH 26.7 (*)     MCHC 31.9 (*)     Neutrophils % 70.1 (*)     Lymphocytes % 20.3 (*)     Monocytes % 7.8 (*)     Eosinophils % 0.9 (*)     Neutrophils, Abs 8.21 (*)      Monocytes, Absolute 0.92 (*)     All other components within normal limits   URINALYSIS, REFLEX TO URINE CULTURE - Abnormal; Notable for the following components:    Protein, UA 30 (*)     Blood, UA Trace (*)     Specific Gravity, UA 1.036 (*)     All other components within normal limits   URINALYSIS, MICROSCOPIC - Abnormal; Notable for the following components:    RBC, UA 7 (*)     Squamous Epithelial Cells, UA Occasional (*)     Mucous Occasional (*)     All other components within normal limits   TROPONIN I - Normal   LIPASE - Normal   TROPONIN I - Normal   CBC W/ AUTO DIFFERENTIAL    Narrative:     The following orders were created for panel order CBC auto differential.  Procedure                               Abnormality         Status                     ---------                               -----------         ------                     CBC with Differential[[id number]]        Abnormal            Final result                 Please view results for these tests on the individual orders.          Imaging Results              US Abdomen Limited_Gallbladder (Final result)  Result time 03/26/24 17:52:33      Final result by Louie Mascorro MD (03/26/24 17:52:33)                   Impression:      Cholelithiasis.  Potential gallstone lodged in the neck of the gallbladder.  No gallbladder wall thickening.  No reported sonographic Lazaro sign.    Mild hepatomegaly    Suspect hepatic steatosis      Electronically signed by: Louie Mascorro  Date:    03/26/2024  Time:    17:52               Narrative:    EXAMINATION:  US ABDOMEN limited to the GALLBLADDER    CLINICAL HISTORY:  Gallstones on CT with epigastric pain;.    COMPARISON:  No previous similar ultrasound    TECHNIQUE:  Real-time ultrasound images are captured and archived.  Targeted sonography of the right upper quadrant was performed.    FINDINGS:  Highly echogenic foci with posterior acoustic shadowing compatible with gallstones are noted in the  gallbladder lumen.  This includes a 1.8 cm gallstone which may be lodged in the neck of the gallbladder.  There is no gallbladder wall thickening.  There is no reported sonographic Lazaro sign.    There is mild diffuse hepatomegaly.  There are mildly increased echoes throughout the hepatic parenchyma compatible with fatty infiltration of the liver.  There is no focal hepatic mass.    Right kidney measures 12.4 cm length and appears normal.    Pancreas is moderately obscured by bowel gas.                                       CT Abdomen Pelvis With IV Contrast NO Oral Contrast (Final result)  Result time 03/26/24 16:22:08      Final result by Noe Juárez DO (03/26/24 16:22:08)                   Impression:      There is mild right-sided pelvicaliectasis without convincing ureteral calculus. There is minimal right perinephric/periureteral fat stranding suspicious for inflammation. There is delayed contrast enhancement of the right kidney.  Findings may reflect recent passage of ureteral calculus on the right. Two adjacent inferior pole right renal calculi are noted which are nonobstructive with the larger measuring up to 1.4 cm in axial dimension.  Cholelithiasis.  Other/detailed findings as above.    The CT exam was performed using one or more of the following dose    reduction techniques- Automated exposure control, adjustment of the mA    and/or kV according to patient size, and/or use of iterative    reconstructed technique.    Point of Service: [facility]      Electronically signed by: Noe Juárez  Date:    03/26/2024  Time:    16:22               Narrative:    EXAMINATION:  CT ABDOMEN PELVIS WITH IV CONTRAST    CLINICAL HISTORY:  Epigastric pain;    COMPARISON:  None    TECHNIQUE:  Multiple axial tomographic images of the abdomen and pelvis were obtained after administration of 100 cc Isovue 370 intravenous contrast.    FINDINGS:  Mild dependent changes of the lungs noted.    No worrisome  focal hepatic abnormality demonstrated on submitted images.  A few low attenuating gallstones are present.  No abnormal gallbladder distension.  Visualized pancreas appears unremarkable.  Spleen grossly unremarkable.    Bilateral adrenal glands grossly unremarkable.  There is mild right-sided pelvicaliectasis without convincing ureteral calculus.  There is minimal right perinephric/periureteral fat stranding suspicious for inflammation.  There is delayed contrast enhancement of the right kidney.  Findings may reflect recent passage of ureteral calculus on the right.  Two adjacent inferior pole right renal calculi are noted which are nonobstructive with the larger measuring up to 1.4 cm in axial dimension. Urinary bladder incompletely distended.  Minimal calcification the prostate.    Surgical clips noted at the gastroesophageal junction.  No convincing evidence of gastrointestinal obstruction or acute appendicitis.  Atherosclerotic calcification demonstrated.  Visualized osseous and surrounding soft tissue structures demonstrate no acute abnormality.  Suspect bone island within the left iliac bone.                                       X-Ray Chest AP Portable (Final result)  Result time 03/26/24 14:43:50      Final result by Ghanshyam Son DO (03/26/24 14:43:50)                   Impression:      Mild prominence of the pulmonary vasculature    Moderately enlarged heart.      Electronically signed by: Ghanshyam Son  Date:    03/26/2024  Time:    14:43               Narrative:    EXAMINATION:  XR CHEST AP PORTABLE    CLINICAL HISTORY:  Chest Pain;    TECHNIQUE:  XR CHEST AP PORTABLE    COMPARISON:  3/26/24    FINDINGS:  No lines or tubes.    Mild prominence of the pulmonary vasculature    Normal pleura.    Moderately enlarged heart.    No obvious acute bone findings.                                       Medications   HYDROmorphone (PF) injection 1 mg (1 mg Intravenous Given 3/26/24 1502)   ondansetron  disintegrating tablet 4 mg (4 mg Oral Given 3/26/24 1503)   hydrALAZINE injection 10 mg (10 mg Intravenous Given 3/26/24 1537)   iopamidoL (ISOVUE-370) injection 100 mL (100 mLs Intravenous Given 3/26/24 1615)   HYDROmorphone (PF) injection 1 mg (1 mg Intravenous Given 3/26/24 1647)   ketorolac injection 30 mg (30 mg Intravenous Given 3/26/24 1647)   hydrALAZINE injection 20 mg (20 mg Intravenous Given 3/26/24 1724)   HYDROmorphone (PF) injection 2 mg (2 mg Intravenous Given 3/26/24 1752)   HYDROmorphone (PF) injection 2 mg (2 mg Intravenous Given 3/26/24 1948)   labetaloL injection 20 mg (20 mg Intravenous Given 3/26/24 1950)   HYDROmorphone (PF) injection 1 mg (1 mg Intravenous Given 3/27/24 0558)     Medical Decision Making  45 y/o male with complaint of abdominal pain across the upper abdomen with radiation to his chest and back. He reports drinking alcohol on and off and last time he drank was 2 days ago.  Cardiac work up and lipase wnl  CT abdomen with contrast showed right perinephric/periureteral fat stranding suspicious for inflammation. Two adjacent nonobstructive inferior pole right renal calculi. Cholelithiasis.  US gallbladder: preliminary reading showing echogenic foci at gallbladder neck.  DDx include: pancreatitis, MI, gastritis, peptic ulcer disease, cholecystitis/cholelithiasis, PE, hypertensive urgency  HTN treated with IV hydralazine.  Dr Tejada, general surgery was called and will be seeing the patient for possible cholecystectomy if indicated.  Case discussed with Dr. Hunter.    Amount and/or Complexity of Data Reviewed  Labs: ordered.     Details: CBC, CMP  Troponin and lipase wnl  Radiology: ordered.     Details: Chest xray: prominent pulmonary vasculature and moderately enlarged heart  ECG/medicine tests: ordered.     Details: EKG sinus rhythm, no ST ischemic changes.    Risk  Prescription drug management.              Attending Attestation:   Physician Attestation Statement for Resident:  As  the supervising MD   Physician Attestation Statement: I have personally seen and examined this patient.   I agree with the above history.  -:   As the supervising MD I agree with the above PE.     As the supervising MD I agree with the above treatment, course, plan, and disposition.                    ED Course as of 03/31/24 0639   Tue Mar 26, 2024   2824 Medical decision-making:  Differential diagnosis includes pancreatitis, gastritis, peptic ulcer disease, cholecystitis, cholelithiasis, hypertension, hypertensive urgency.  All labs and imaging ordered and interpreted by me. [BB]   1524 Troponin is normal.  CMP is normal. [BB]   1754 Ultrasound shows possible stone impacted in gallbladder neck.  Because of this I discussed case with general surgeon on-call, Dr. Tejada who is going to evaluate patient. [BB]      ED Course User Index  [BB] Ludin Hunter MD          After surgery saw patient he admitted patient.                 Clinical Impression:   Final diagnoses:  [R07.9] Chest pain  [K80.20] Calculus of gallbladder without cholecystitis without obstruction  [R10.13] Epigastric pain        ED Disposition Condition    Observation                 Brian Ovalles MD  Resident  03/26/24 1759       Brian Ovalles MD  Resident  03/26/24 1804       Ludin Hunter MD  03/31/24 0651

## 2024-03-27 ENCOUNTER — ANESTHESIA EVENT (OUTPATIENT)
Dept: SURGERY | Facility: HOSPITAL | Age: 44
End: 2024-03-27

## 2024-03-27 ENCOUNTER — ANESTHESIA (OUTPATIENT)
Dept: SURGERY | Facility: HOSPITAL | Age: 44
End: 2024-03-27

## 2024-03-27 VITALS
HEIGHT: 69 IN | BODY MASS INDEX: 43.4 KG/M2 | RESPIRATION RATE: 16 BRPM | DIASTOLIC BLOOD PRESSURE: 92 MMHG | WEIGHT: 293 LBS | OXYGEN SATURATION: 95 % | HEART RATE: 100 BPM | SYSTOLIC BLOOD PRESSURE: 178 MMHG | TEMPERATURE: 98 F

## 2024-03-27 PROBLEM — T88.4XXA HARD TO INTUBATE: Status: ACTIVE | Noted: 2024-03-27

## 2024-03-27 PROBLEM — K80.00 CHOLELITHIASIS WITH ACUTE CHOLECYSTITIS: Status: RESOLVED | Noted: 2024-03-26 | Resolved: 2024-03-27

## 2024-03-27 LAB
ALBUMIN SERPL BCP-MCNC: 3.9 G/DL (ref 3.5–5)
ALBUMIN/GLOB SERPL: 0.9 {RATIO}
ALP SERPL-CCNC: 83 U/L (ref 45–115)
ALT SERPL W P-5'-P-CCNC: 27 U/L (ref 16–61)
ANION GAP SERPL CALCULATED.3IONS-SCNC: 9 MMOL/L (ref 7–16)
AST SERPL W P-5'-P-CCNC: 16 U/L (ref 15–37)
BASOPHILS # BLD AUTO: 0.07 K/UL (ref 0–0.2)
BASOPHILS NFR BLD AUTO: 0.4 % (ref 0–1)
BILIRUB SERPL-MCNC: 0.5 MG/DL (ref ?–1.2)
BUN SERPL-MCNC: 22 MG/DL (ref 7–18)
BUN/CREAT SERPL: 19 (ref 6–20)
CALCIUM SERPL-MCNC: 9.6 MG/DL (ref 8.5–10.1)
CHLORIDE SERPL-SCNC: 106 MMOL/L (ref 98–107)
CO2 SERPL-SCNC: 27 MMOL/L (ref 21–32)
CREAT SERPL-MCNC: 1.15 MG/DL (ref 0.7–1.3)
DIFFERENTIAL METHOD BLD: ABNORMAL
EGFR (NO RACE VARIABLE) (RUSH/TITUS): 81 ML/MIN/1.73M2
EOSINOPHIL # BLD AUTO: 0.02 K/UL (ref 0–0.5)
EOSINOPHIL NFR BLD AUTO: 0.1 % (ref 1–4)
ERYTHROCYTE [DISTWIDTH] IN BLOOD BY AUTOMATED COUNT: 13.8 % (ref 11.5–14.5)
GLOBULIN SER-MCNC: 4.4 G/DL (ref 2–4)
GLUCOSE SERPL-MCNC: 137 MG/DL (ref 74–106)
HCT VFR BLD AUTO: 46.1 % (ref 40–54)
HGB BLD-MCNC: 14.7 G/DL (ref 13.5–18)
IMM GRANULOCYTES # BLD AUTO: 0.06 K/UL (ref 0–0.04)
IMM GRANULOCYTES NFR BLD: 0.4 % (ref 0–0.4)
LYMPHOCYTES # BLD AUTO: 2.09 K/UL (ref 1–4.8)
LYMPHOCYTES NFR BLD AUTO: 12.3 % (ref 27–41)
MCH RBC QN AUTO: 26.9 PG (ref 27–31)
MCHC RBC AUTO-ENTMCNC: 31.9 G/DL (ref 32–36)
MCV RBC AUTO: 84.3 FL (ref 80–96)
MONOCYTES # BLD AUTO: 1.42 K/UL (ref 0–0.8)
MONOCYTES NFR BLD AUTO: 8.3 % (ref 2–6)
MPC BLD CALC-MCNC: 10.5 FL (ref 9.4–12.4)
NEUTROPHILS # BLD AUTO: 13.4 K/UL (ref 1.8–7.7)
NEUTROPHILS NFR BLD AUTO: 78.5 % (ref 53–65)
NRBC # BLD AUTO: 0 X10E3/UL
NRBC, AUTO (.00): 0 %
PLATELET # BLD AUTO: 369 K/UL (ref 150–400)
POTASSIUM SERPL-SCNC: 3.8 MMOL/L (ref 3.5–5.1)
PROT SERPL-MCNC: 8.3 G/DL (ref 6.4–8.2)
RBC # BLD AUTO: 5.47 M/UL (ref 4.6–6.2)
SODIUM SERPL-SCNC: 138 MMOL/L (ref 136–145)
TSH SERPL DL<=0.005 MIU/L-ACNC: 1.49 UIU/ML (ref 0.36–3.74)
WBC # BLD AUTO: 17.06 K/UL (ref 4.5–11)

## 2024-03-27 PROCEDURE — 80053 COMPREHEN METABOLIC PANEL: CPT | Performed by: SURGERY

## 2024-03-27 PROCEDURE — G0378 HOSPITAL OBSERVATION PER HR: HCPCS

## 2024-03-27 PROCEDURE — 36000709 HC OR TIME LEV III EA ADD 15 MIN: Performed by: SURGERY

## 2024-03-27 PROCEDURE — 37000009 HC ANESTHESIA EA ADD 15 MINS: Performed by: SURGERY

## 2024-03-27 PROCEDURE — 25000003 PHARM REV CODE 250: Performed by: NURSE ANESTHETIST, CERTIFIED REGISTERED

## 2024-03-27 PROCEDURE — 63600175 PHARM REV CODE 636 W HCPCS: Performed by: NURSE ANESTHETIST, CERTIFIED REGISTERED

## 2024-03-27 PROCEDURE — 25000003 PHARM REV CODE 250: Performed by: SURGERY

## 2024-03-27 PROCEDURE — D9220A PRA ANESTHESIA: Mod: ,,, | Performed by: ANESTHESIOLOGY

## 2024-03-27 PROCEDURE — 27201423 OPTIME MED/SURG SUP & DEVICES STERILE SUPPLY: Performed by: SURGERY

## 2024-03-27 PROCEDURE — 63600175 PHARM REV CODE 636 W HCPCS: Mod: JZ,JG | Performed by: SURGERY

## 2024-03-27 PROCEDURE — 88304 TISSUE EXAM BY PATHOLOGIST: CPT | Mod: TC,SUR | Performed by: SURGERY

## 2024-03-27 PROCEDURE — 36000708 HC OR TIME LEV III 1ST 15 MIN: Performed by: SURGERY

## 2024-03-27 PROCEDURE — 37000008 HC ANESTHESIA 1ST 15 MINUTES: Performed by: SURGERY

## 2024-03-27 PROCEDURE — 47562 LAPAROSCOPIC CHOLECYSTECTOMY: CPT | Mod: ,,, | Performed by: SURGERY

## 2024-03-27 PROCEDURE — 88304 TISSUE EXAM BY PATHOLOGIST: CPT | Mod: 26,,, | Performed by: PATHOLOGY

## 2024-03-27 PROCEDURE — 87070 CULTURE OTHR SPECIMN AEROBIC: CPT | Performed by: SURGERY

## 2024-03-27 PROCEDURE — 71000033 HC RECOVERY, INTIAL HOUR: Performed by: SURGERY

## 2024-03-27 PROCEDURE — 63600175 PHARM REV CODE 636 W HCPCS: Performed by: SURGERY

## 2024-03-27 PROCEDURE — 85025 COMPLETE CBC W/AUTO DIFF WBC: CPT | Performed by: SURGERY

## 2024-03-27 PROCEDURE — 96361 HYDRATE IV INFUSION ADD-ON: CPT

## 2024-03-27 PROCEDURE — 84443 ASSAY THYROID STIM HORMONE: CPT | Performed by: REGISTERED NURSE

## 2024-03-27 PROCEDURE — 87075 CULTR BACTERIA EXCEPT BLOOD: CPT | Performed by: SURGERY

## 2024-03-27 PROCEDURE — 96366 THER/PROPH/DIAG IV INF ADDON: CPT

## 2024-03-27 PROCEDURE — 96376 TX/PRO/DX INJ SAME DRUG ADON: CPT

## 2024-03-27 RX ORDER — HYDROMORPHONE HYDROCHLORIDE 2 MG/ML
1 INJECTION, SOLUTION INTRAMUSCULAR; INTRAVENOUS; SUBCUTANEOUS ONCE
Status: COMPLETED | OUTPATIENT
Start: 2024-03-27 | End: 2024-03-27

## 2024-03-27 RX ORDER — MEPERIDINE HYDROCHLORIDE 25 MG/ML
25 INJECTION INTRAMUSCULAR; INTRAVENOUS; SUBCUTANEOUS EVERY 10 MIN PRN
Status: DISCONTINUED | OUTPATIENT
Start: 2024-03-27 | End: 2024-03-27 | Stop reason: HOSPADM

## 2024-03-27 RX ORDER — ROCURONIUM BROMIDE 10 MG/ML
INJECTION, SOLUTION INTRAVENOUS
Status: DISCONTINUED | OUTPATIENT
Start: 2024-03-27 | End: 2024-03-27

## 2024-03-27 RX ORDER — DIPHENHYDRAMINE HYDROCHLORIDE 50 MG/ML
25 INJECTION INTRAMUSCULAR; INTRAVENOUS EVERY 6 HOURS PRN
Status: DISCONTINUED | OUTPATIENT
Start: 2024-03-27 | End: 2024-03-27 | Stop reason: HOSPADM

## 2024-03-27 RX ORDER — MIDAZOLAM HYDROCHLORIDE 1 MG/ML
INJECTION INTRAMUSCULAR; INTRAVENOUS
Status: DISCONTINUED | OUTPATIENT
Start: 2024-03-27 | End: 2024-03-27

## 2024-03-27 RX ORDER — HYDROCODONE BITARTRATE AND ACETAMINOPHEN 7.5; 325 MG/1; MG/1
1 TABLET ORAL EVERY 8 HOURS PRN
Qty: 10 TABLET | Refills: 0 | Status: SHIPPED | OUTPATIENT
Start: 2024-03-27 | End: 2024-03-28 | Stop reason: DRUGHIGH

## 2024-03-27 RX ORDER — LISINOPRIL 20 MG/1
20 TABLET ORAL DAILY
Qty: 30 TABLET | Refills: 0 | Status: SHIPPED | OUTPATIENT
Start: 2024-03-27

## 2024-03-27 RX ORDER — LIDOCAINE HYDROCHLORIDE 40 MG/ML
SOLUTION TOPICAL
Status: DISCONTINUED | OUTPATIENT
Start: 2024-03-27 | End: 2024-03-27

## 2024-03-27 RX ORDER — ONDANSETRON HYDROCHLORIDE 2 MG/ML
4 INJECTION, SOLUTION INTRAVENOUS DAILY PRN
Status: DISCONTINUED | OUTPATIENT
Start: 2024-03-27 | End: 2024-03-27 | Stop reason: HOSPADM

## 2024-03-27 RX ORDER — HYDROMORPHONE HYDROCHLORIDE 2 MG/ML
0.5 INJECTION, SOLUTION INTRAMUSCULAR; INTRAVENOUS; SUBCUTANEOUS EVERY 5 MIN PRN
Status: DISCONTINUED | OUTPATIENT
Start: 2024-03-27 | End: 2024-03-27 | Stop reason: HOSPADM

## 2024-03-27 RX ORDER — MORPHINE SULFATE 10 MG/ML
4 INJECTION INTRAMUSCULAR; INTRAVENOUS; SUBCUTANEOUS EVERY 5 MIN PRN
Status: DISCONTINUED | OUTPATIENT
Start: 2024-03-27 | End: 2024-03-27 | Stop reason: HOSPADM

## 2024-03-27 RX ORDER — ONDANSETRON HYDROCHLORIDE 2 MG/ML
INJECTION, SOLUTION INTRAVENOUS
Status: DISCONTINUED | OUTPATIENT
Start: 2024-03-27 | End: 2024-03-27

## 2024-03-27 RX ORDER — LIDOCAINE HYDROCHLORIDE 20 MG/ML
INJECTION, SOLUTION EPIDURAL; INFILTRATION; INTRACAUDAL; PERINEURAL
Status: DISCONTINUED | OUTPATIENT
Start: 2024-03-27 | End: 2024-03-27

## 2024-03-27 RX ORDER — BUPIVACAINE HYDROCHLORIDE 2.5 MG/ML
INJECTION, SOLUTION EPIDURAL; INFILTRATION; INTRACAUDAL
Status: DISCONTINUED | OUTPATIENT
Start: 2024-03-27 | End: 2024-03-27 | Stop reason: HOSPADM

## 2024-03-27 RX ORDER — PROPOFOL 10 MG/ML
VIAL (ML) INTRAVENOUS
Status: DISCONTINUED | OUTPATIENT
Start: 2024-03-27 | End: 2024-03-27

## 2024-03-27 RX ORDER — FENTANYL CITRATE 50 UG/ML
INJECTION, SOLUTION INTRAMUSCULAR; INTRAVENOUS
Status: DISCONTINUED | OUTPATIENT
Start: 2024-03-27 | End: 2024-03-27

## 2024-03-27 RX ORDER — HYDROMORPHONE HYDROCHLORIDE 2 MG/ML
2 INJECTION, SOLUTION INTRAMUSCULAR; INTRAVENOUS; SUBCUTANEOUS
Status: DISCONTINUED | OUTPATIENT
Start: 2024-03-27 | End: 2024-03-27 | Stop reason: HOSPADM

## 2024-03-27 RX ORDER — DEXAMETHASONE SODIUM PHOSPHATE 4 MG/ML
INJECTION, SOLUTION INTRA-ARTICULAR; INTRALESIONAL; INTRAMUSCULAR; INTRAVENOUS; SOFT TISSUE
Status: DISCONTINUED | OUTPATIENT
Start: 2024-03-27 | End: 2024-03-27

## 2024-03-27 RX ORDER — CEFAZOLIN SODIUM 1 G/3ML
INJECTION, POWDER, FOR SOLUTION INTRAMUSCULAR; INTRAVENOUS
Status: DISCONTINUED | OUTPATIENT
Start: 2024-03-27 | End: 2024-03-27

## 2024-03-27 RX ADMIN — SUGAMMADEX 200 MG: 100 INJECTION, SOLUTION INTRAVENOUS at 12:03

## 2024-03-27 RX ADMIN — PIPERACILLIN AND TAZOBACTAM 4.5 G: 4; .5 INJECTION, POWDER, FOR SOLUTION INTRAVENOUS; PARENTERAL at 02:03

## 2024-03-27 RX ADMIN — PROPOFOL 200 MG: 10 INJECTION, EMULSION INTRAVENOUS at 10:03

## 2024-03-27 RX ADMIN — SODIUM CHLORIDE: 4.5 INJECTION, SOLUTION INTRAVENOUS at 05:03

## 2024-03-27 RX ADMIN — HYDRALAZINE HYDROCHLORIDE 10 MG: 20 INJECTION INTRAMUSCULAR; INTRAVENOUS at 02:03

## 2024-03-27 RX ADMIN — LIDOCAINE HYDROCHLORIDE 80 MG: 20 INJECTION, SOLUTION INTRAVENOUS at 10:03

## 2024-03-27 RX ADMIN — HYDROCODONE BITARTRATE AND ACETAMINOPHEN 1 TABLET: 5; 325 TABLET ORAL at 05:03

## 2024-03-27 RX ADMIN — CEFAZOLIN 3 G: 1 INJECTION, POWDER, FOR SOLUTION INTRAMUSCULAR; INTRAVENOUS; PARENTERAL at 10:03

## 2024-03-27 RX ADMIN — ROCURONIUM BROMIDE 15 MG: 10 INJECTION, SOLUTION INTRAVENOUS at 11:03

## 2024-03-27 RX ADMIN — HYDRALAZINE HYDROCHLORIDE 10 MG: 20 INJECTION INTRAMUSCULAR; INTRAVENOUS at 01:03

## 2024-03-27 RX ADMIN — ROCURONIUM BROMIDE 40 MG: 10 INJECTION, SOLUTION INTRAVENOUS at 10:03

## 2024-03-27 RX ADMIN — ONDANSETRON 4 MG: 2 INJECTION INTRAMUSCULAR; INTRAVENOUS at 10:03

## 2024-03-27 RX ADMIN — MORPHINE SULFATE 6 MG: 2 INJECTION, SOLUTION INTRAMUSCULAR; INTRAVENOUS at 02:03

## 2024-03-27 RX ADMIN — ROCURONIUM BROMIDE 10 MG: 10 INJECTION, SOLUTION INTRAVENOUS at 11:03

## 2024-03-27 RX ADMIN — FENTANYL CITRATE 100 MCG: 50 INJECTION INTRAMUSCULAR; INTRAVENOUS at 09:03

## 2024-03-27 RX ADMIN — PIPERACILLIN AND TAZOBACTAM 4.5 G: 4; .5 INJECTION, POWDER, FOR SOLUTION INTRAVENOUS; PARENTERAL at 04:03

## 2024-03-27 RX ADMIN — LIDOCAINE HYDROCHLORIDE 120 MG: 40 SOLUTION TOPICAL at 10:03

## 2024-03-27 RX ADMIN — MIDAZOLAM HYDROCHLORIDE 2 MG: 1 INJECTION, SOLUTION INTRAMUSCULAR; INTRAVENOUS at 09:03

## 2024-03-27 RX ADMIN — LISINOPRIL 20 MG: 20 TABLET ORAL at 08:03

## 2024-03-27 RX ADMIN — HYDROMORPHONE HYDROCHLORIDE 1 MG: 2 INJECTION INTRAMUSCULAR; INTRAVENOUS; SUBCUTANEOUS at 05:03

## 2024-03-27 RX ADMIN — HYDRALAZINE HYDROCHLORIDE 10 MG: 20 INJECTION INTRAMUSCULAR; INTRAVENOUS at 08:03

## 2024-03-27 RX ADMIN — DEXAMETHASONE SODIUM PHOSPHATE 8 MG: 4 INJECTION, SOLUTION INTRA-ARTICULAR; INTRALESIONAL; INTRAMUSCULAR; INTRAVENOUS; SOFT TISSUE at 10:03

## 2024-03-27 NOTE — ED NOTES
Rec'vd report from MAURICIO Horn. Pt lying in bed with HOB slightly elevated.  Pt states his pain is 10/10 again at this time.  Pt's blood pressure is elevated - when nurse asked about home medications related to blood pressure he states he just ran out and never went to a doctor to get it refilled so he has not taken it in about 6 months.  Nurse explained to patient that the pain will cause the pressure to be up some but that he also needed to follow up outpatient with his primary doctor related to the pressure due to it makes him at a high rate for heart attack, stroke and many other systems such as kidneys.  Wife at bedside and states she will make sure that he follows up.  Nurse instructed pt that she would speak with MD related to pain and also related to the blood pressure at this time.  Denies nausea at present.  Call light left in reach and bed in low position, instructed to call for assistance.  MD notified upon leaving room that the blood pressure was still elevated and complaints of pain

## 2024-03-27 NOTE — ANESTHESIA POSTPROCEDURE EVALUATION
Anesthesia Post Evaluation    Patient: Jadon Hussein    Procedure(s) Performed: Procedure(s) (LRB):  CHOLECYSTECTOMY, LAPAROSCOPIC (N/A)    Final Anesthesia Type: general      Patient location during evaluation: PACU  Post-procedure vital signs: reviewed and stable  Pain management: adequate  Airway patency: patent    PONV status at discharge: No PONV  Anesthetic complications: no      Cardiovascular status: hemodynamically stable  Respiratory status: unassisted  Hydration status: euvolemic  Follow-up not needed.              Vitals Value Taken Time   /92 03/27/24 1520   Temp 36.8 °C (98.2 °F) 03/27/24 1438   Pulse 100 03/27/24 1520   Resp 16 03/27/24 1717   SpO2 95 % 03/27/24 1520         Event Time   Out of Recovery 03/27/2024 13:25:29         Pain/Roxanne Score: Pain Rating Prior to Med Admin: 4 (3/27/2024  5:17 PM)  Pain Rating Post Med Admin: 8 (3/27/2024  3:20 AM)  Roxanne Score: 8 (3/27/2024  1:25 PM)

## 2024-03-27 NOTE — PROGRESS NOTES
D/c instructions given. Pt wife/voiced understanding. Ambulated per pt preference with wife to pov.

## 2024-03-27 NOTE — HPI
44-year-old male patient who presented to the emergency department with severe epigastric pain radiating bilaterally to the subcostal regions earlier today.  He had a similar episode 2 days previously that resolved some extent.  When the pain returned today, he decided to proceed to the emergency department.  He has a past surgical history of anti-reflux surgery, which I presumed was a Nissen fundoplication performed laparoscopically, based on what he in the family tell me about where his incisions are.  In the emergency department, the patient had a CT scan which was fairly unrevealing, so a gallbladder ultrasound was ordered revealing 2 stones in the neck of the gallbladder without definite gallbladder wall thickening.  Surgery was asked to

## 2024-03-27 NOTE — ANESTHESIA PREPROCEDURE EVALUATION
03/27/2024  Jadon Hussein is a 44 y.o., male.      Pre-op Assessment    I have reviewed the Patient Summary Reports.    I have reviewed the NPO Status.   I have reviewed the Medications.     Review of Systems         Anesthesia Plan  Type of Anesthesia, risks & benefits discussed:    Anesthesia Type: Gen ETT  Intra-op Monitoring Plan: Standard ASA Monitors  Post Op Pain Control Plan: IV/PO Opioids PRN  Induction:  IV  Informed Consent: Informed consent signed with the Patient and all parties understand the risks and agree with anesthesia plan.  All questions answered.   ASA Score: 2    Ready For Surgery From Anesthesia Perspective.     .  No known anesthetic complications  NKDA    Hct 46    Medical History   ADHD  Hypertension   HTN    Airway exam deferred (COVID precautions); adequate ROM at neck.

## 2024-03-27 NOTE — PHARMACY MED REC
"Admission Medication History     The home medication history was taken by Catie Short.    You may go to "Admission" then "Reconcile Home Medications" tabs to review and/or act upon these items.     The home medication list has been updated by the Pharmacy department.   Please read ALL comments highlighted in yellow.   Please address this information as you see fit.    Feel free to contact us if you have any questions or require assistance.  Patient confirms he is currently not taking any prescription or OTC medications.      The medications listed below were removed from the home medication list. Please reorder if appropriate:  Adderall XR 15 mg  Lisinopril 20 mg  Promethazine 25 mg      Medications listed below were obtained from: Patient/family and Analytic software- [employer]  (Not in a hospital admission)        Current Outpatient Medications on File Prior to Encounter   Medication Sig Dispense Refill Last Dose    [DISCONTINUED] dextroamphetamine-amphetamine (ADDERALL XR) 15 MG 24 hr capsule Take 15 mg by mouth 4 (four) times daily.       [DISCONTINUED] lisinopriL (PRINIVIL,ZESTRIL) 20 MG tablet Take 1 tablet (20 mg total) by mouth once daily. 30 tablet 3     [DISCONTINUED] promethazine (PHENERGAN) 25 MG tablet Take 1 tablet (25 mg total) by mouth every 6 (six) hours as needed for Nausea. (Patient not taking: No sig reported) 20 tablet 0          Potential issues to be addressed PRIOR TO DISCHARGE  No issues identified.    Catie Short  Pharmacy Tech Specialist - Medication History  EXT. [id number]      .          "

## 2024-03-27 NOTE — SUBJECTIVE & OBJECTIVE
No current facility-administered medications on file prior to encounter.     Current Outpatient Medications on File Prior to Encounter   Medication Sig    dextroamphetamine-amphetamine (ADDERALL XR) 15 MG 24 hr capsule Take 15 mg by mouth 4 (four) times daily.    lisinopriL (PRINIVIL,ZESTRIL) 20 MG tablet Take 1 tablet (20 mg total) by mouth once daily.    promethazine (PHENERGAN) 25 MG tablet Take 1 tablet (25 mg total) by mouth every 6 (six) hours as needed for Nausea. (Patient not taking: No sig reported)       Review of patient's allergies indicates:  No Known Allergies    Past Medical History:   Diagnosis Date    ADHD (attention deficit hyperactivity disorder)     Hypertension      No past surgical history on file.  Family History    None       Tobacco Use    Smoking status: Former    Smokeless tobacco: Never   Substance and Sexual Activity    Alcohol use: Not Currently    Drug use: Not on file    Sexual activity: Not on file     Review of Systems   All other systems reviewed and are negative.    Objective:     Vital Signs (Most Recent):  Temp: 97.5 °F (36.4 °C) (03/26/24 1412)  Pulse: 93 (03/26/24 2002)  Resp: 15 (03/26/24 2002)  BP: (!) 188/92 (03/26/24 2002)  SpO2: 97 % (03/26/24 2002) Vital Signs (24h Range):  Temp:  [97.5 °F (36.4 °C)] 97.5 °F (36.4 °C)  Pulse:  [] 93  Resp:  [8-24] 15  SpO2:  [91 %-100 %] 97 %  BP: (180-252)/() 188/92     Weight: 132.9 kg (293 lb)  Body mass index is 43.27 kg/m².     Physical Exam  Cardiovascular:      Rate and Rhythm: Normal rate.   Pulmonary:      Effort: Pulmonary effort is normal.   Abdominal:      Palpations: Abdomen is soft.      Tenderness: There is abdominal tenderness (Epigastric and right upper quadrant).   Skin:     Coloration: Skin is not jaundiced.   Neurological:      General: No focal deficit present.      Mental Status: He is alert.   Psychiatric:         Mood and Affect: Mood normal.            I have reviewed all pertinent lab results within  the past 24 hours.  CBC:   Recent Labs   Lab 03/26/24  1429   WBC 11.73*   RBC 5.66   HGB 15.1   HCT 47.4      MCV 83.7   MCH 26.7*   MCHC 31.9*     BMP:   Recent Labs   Lab 03/26/24  1429   *      K 3.7      CO2 28   BUN 21*   CREATININE 1.27   CALCIUM 9.9     CMP:   Recent Labs   Lab 03/26/24  1429   *   CALCIUM 9.9   ALBUMIN 3.9   PROT 8.6*      K 3.7   CO2 28      BUN 21*   CREATININE 1.27   ALKPHOS 89   ALT 37   AST 12*   BILITOT 0.4       Significant Diagnostics:  I have reviewed all pertinent imaging results/findings within the past 24 hours.  CT: I have reviewed all pertinent results/findings within the past 24 hours and my personal findings are:  Per HPI  U/S: I have reviewed all pertinent results/findings within the past 24 hours and my personal findings are:  Per HPI

## 2024-03-27 NOTE — ASSESSMENT & PLAN NOTE
Patient will be admitted tonight for IV hydration and antibiotics, then proceed to the operating room when 1 available in the morning.      Risks and benefits of surgery discussed to include infection, bleeding, hernia, possible drain, duct injury, retained stones, open conversion, possible need for postop ERCP or further surgery, and unforeseen.  Patient expresses understanding and wishes to proceed.

## 2024-03-27 NOTE — PROGRESS NOTES
Released to RANDI Menendez /101, 108, 16. Family present in room. Discussed with wife the increased difficulty to ventilate her  after being put to sleep related to the thickness of his beard. Also discussed him having ELAINE after waking from surgery and during the recovery phase, and the need to have a sleep study done in the very near future. Discussed the risk of increased heart disease and possible diabetes associated with ELAINE. RANDI Menendez RN states she would speak to the hospitalist and see if he would give him a referral.

## 2024-03-27 NOTE — ANESTHESIA PROCEDURE NOTES
Intubation    Date/Time: 3/27/2024 10:13 AM    Performed by: Bautista Gomez CRNA  Authorized by: Mikhail Davila MD    Intubation:     Induction:  Intravenous    Intubated:  Postinduction    Mask Ventilation:  Very difficult with oral airway (tegaderm placed over beard and still difficult with two hand mask)    Attempts:  1    Attempted By:  CRNA    Method of Intubation:  Video laryngoscopy    Blade:  Carlos 4    Laryngeal View Grade: Grade III - only epiglottis visible      Difficult Airway Encountered?: Yes      Future Airway Recommendations:  Place tegaderms or other techniques to ventilate before induction.. pt has limited FRC and difficult to ventlate after induction    Complications:  None    Airway Device:  Oral endotracheal tube    Airway Device Size:  7.5    Style/Cuff Inflation:  Cuffed    Inflation Amount (mL):  8    Tube secured:  21    Secured at:  The lips    Placement Verified By:  Capnometry    Complicating Factors:  None    Findings Post-Intubation:  BS equal bilateral and atraumatic/condition of teeth unchanged  Notes:      Pt extremely difficult mask ventilation due to large beard, limited mouth opening and very poor dentition with many broken teeth top and bottom placed 3 large Tegaderm over beard no paralytic given because ventilation was moderately difficult to intubate with glidescope

## 2024-03-27 NOTE — OP NOTE
[facility]  General Surgery  Operative Note        Date of Procedure: 3/27/2024     Procedure: Procedure(s) (LRB):  CHOLECYSTECTOMY, LAPAROSCOPIC (N/A)       Surgeon(s) and Role:     * Misha Tejada MD - Primary    Assisting Surgeon: None    Pre-Operative Diagnosis: Calculus of gallbladder with acute cholecystitis without obstruction [K80.00]    Post-Operative Diagnosis: Post-Op Diagnosis Codes:     * Calculus of gallbladder with acute cholecystitis without obstruction [K80.00]    Anesthesia: General    Operative Findings (including complications, if any):  There was extensive inflammatory change noted.  There was thickening of the gallbladder wall and dense inflammation in Calot's triangle.  Nonetheless operation was able to be completed laparoscopically.  Modifier 22 should be added due to the operation lasting at least an hour longer than would ordinarily be expected secondary to the amount of inflammatory change.    Description of Technical Procedures: The patient was brought to the operating room and placed in supine position. General anesthesia was administered. The abdomen was prepped and draped in standard fashion.  Incision was performed in the right upper quadrant and a Visiport entry was performed using a 5 mm trocar. CO2 pneumoperitoneum was established after inserting Oneil cannula. 5 mm trochars were then placed in the epigastrium, left and right upper quadrant.  A 10 mm trocar was then placed in the midline under direct visualization using a  trocar.   A needle aspirate referral was then utilized to aspirate bile from the gallbladder.  Cultures were obtained.  The fundus was retracted upward and over the liver, and the infundibulum was retracted laterally.  Slow and tedious dissection was then performed in the area of the infundibular.  The cystic artery was identified 1st and was clipped twice proximally, once distally and transected.  Continued dissection  was performed to the cystic duct was able to be isolated.  The cystic duct was clipped 3 times proximally, once distally, and transected.  Cautery was then used to dissect the gallbladder free from the surface of the liver.  There was good hemostasis. Port sites were then infiltrated with local at the level of the fascia and peritoneum.  The suction  was used to irrigate the right upper quadrant and suction free spilled bile The gallbladder was removed inside an Endo-Catch bag.  The midline incision had to be enlarged to facilitate extraction.  Ports were then removed and CO2 pneumoperitoneum was allowed to escape. The fascia of the supraumbilical incision was closed using interrupted PDS. All port sites closed skin level with Subcu vicryl. Patient was awakened from anesthesia in stable condition.         Estimated Blood Loss (EBL):25           Implants: * No implants in log *    Specimens:  Gallbladder      Specimen (24h ago, onward)       Start     Ordered    03/27/24 1218  Surgical Pathology  RELEASE UPON ORDERING         03/27/24 1218                            Condition: Good    Disposition: PACU - hemodynamically stable.

## 2024-03-27 NOTE — TRANSFER OF CARE
"Anesthesia Transfer of Care Note    Patient: Jadon Hussein    Procedure(s) Performed: Procedure(s) (LRB):  CHOLECYSTECTOMY, LAPAROSCOPIC (N/A)    Patient location: PACU    Anesthesia Type: general    Transport from OR: Transported from OR on room air with adequate spontaneous ventilation    Post pain: adequate analgesia    Post assessment: no apparent anesthetic complications and tolerated procedure well    Post vital signs: stable    Level of consciousness: responds to stimulation    Nausea/Vomiting: no nausea/vomiting    Complications: none    Transfer of care protocol was followedComments: Report Given to PACU rn VSS      Last vitals: Visit Vitals  BP (!) 158/94 (BP Location: Right arm, Patient Position: Lying)   Pulse (!) 116   Temp 36.6 °C (97.9 °F)   Resp 17   Ht 5' 9" (1.753 m)   Wt 132.9 kg (293 lb)   SpO2 95%   BMI 43.27 kg/m²     "

## 2024-03-27 NOTE — PLAN OF CARE
[facility]  Initial Discharge Assessment       Primary Care Provider: Yenny, Primary Doctor    Admission Diagnosis: Chest pain [R07.9]    Admission Date: 3/26/2024  Expected Discharge Date:     Transition of Care Barriers: None    Payor: MEDICAID [place] / Plan: MEDICAID [place] [employer] / Product Type: Managed Medicaid /     Extended Emergency Contact Information  Primary Emergency Contact: MIRA VAZQUEZ  Mobile Phone: [phone]  Relation: Spouse  Preferred language: English   needed? No    Discharge Plan A: Home with family  Discharge Plan B: Home with family      [facility] - [place], [place] - [address]  [address]  [place] [address]  Phone: [phone] Fax: [phone]    [id number] - [place], [place] - [address] AT [facility]  [address]  [place] [address]  Phone: [phone] Fax: [phone]    [facility], [place] - [address]  [address]  [place] [address]  Phone: [phone] Fax: [phone]      Initial Assessment (most recent)       Adult Discharge Assessment - 03/27/24 1200          Discharge Assessment    Assessment Type Discharge Planning Assessment     Source of Information family     People in Home spouse     Do you expect to return to your current living situation? Yes     Do you have help at home or someone to help you manage your care at home? Yes     Who are your caregiver(s) and their phone number(s)? Mira Parkinson- Spouse [phone]     Prior to hospitilization cognitive status: Unable to Assess     Current cognitive status: Alert/Oriented     Walking or Climbing Stairs Difficulty no     Dressing/Bathing Difficulty no     Home Accessibility stairs to enter home     Number of Stairs, Main Entrance four     Equipment Currently Used at Home none     Readmission within 30 days? No     Patient currently  being followed by outpatient case management? No     Do you currently have service(s) that help you manage your care at home? No     Do you take prescription medications? Yes     Do you have prescription coverage? Yes     Coverage Medicaid     Do you have any problems affording any of your prescribed medications? No     Is the patient taking medications as prescribed? yes     Who is going to help you get home at discharge? Spouse     How do you get to doctors appointments? car, drives self     Are you on dialysis? No     Do you take coumadin? No     Discharge Plan A Home with family     Discharge Plan B Home with family     DME Needed Upon Discharge  none     Discharge Plan discussed with: Spouse/sig other     Name(s) and Number(s) Gilberto Parkinson- Spouse     Transition of Care Barriers None        Physical Activity    On average, how many days per week do you engage in moderate to strenuous exercise (like a brisk walk)? 0 days     On average, how many minutes do you engage in exercise at this level? 0 min        Financial Resource Strain    How hard is it for you to pay for the very basics like food, housing, medical care, and heating? Not hard at all        Housing Stability    In the last 12 months, was there a time when you were not able to pay the mortgage or rent on time? No     In the last 12 months, was there a time when you did not have a steady place to sleep or slept in a shelter (including now)? No        Transportation Needs    In the past 12 months, has lack of transportation kept you from medical appointments or from getting medications? No     In the past 12 months, has lack of transportation kept you from meetings, work, or from getting things needed for daily living? No        Food Insecurity    Within the past 12 months, you worried that your food would run out before you got the money to buy more. Never true     Within the past 12 months, the food you bought just didn't last and you didn't have  money to get more. Never true        Stress    Do you feel stress - tense, restless, nervous, or anxious, or unable to sleep at night because your mind is troubled all the time - these days? Not at all        Social Connections    In a typical week, how many times do you talk on the phone with family, friends, or neighbors? More than three times a week     How often do you get together with friends or relatives? More than three times a week     How often do you attend Adventist or Nondenominational services? More than 4 times per year     Do you belong to any clubs or organizations such as Adventist groups, unions, fraternal or athletic groups, or school groups? No     How often do you attend meetings of the clubs or organizations you belong to? Never     Are you , , , , never , or living with a partner?         Alcohol Use    Q1: How often do you have a drink containing alcohol? Never     Q2: How many drinks containing alcohol do you have on a typical day when you are drinking? Patient does not drink     Q3: How often do you have six or more drinks on one occasion? Never                   Pt not in room at time of initial dcp assessment. Pts spouse, Gilberto at bedside. Pt lives at home with spouse, not current with hh and uses no dme. The plan is for pt to dc back home when medically ready for dc. SDOH questions completed. SW will cont to follow for dc needs.

## 2024-03-27 NOTE — H&P
[facility] Emergency Department  General Surgery  History & Physical    Patient Name: Jadon Hussein  MRN: [id number]  Admission Date: 3/26/2024  Attending Physician: Misha Tejada MD  Primary Care Provider: Yenny Primary Doctor    Patient information was obtained from patient, spouse/SO, and ER records.     Subjective:     Chief Complaint/Reason for Admission:  Abdominal pain and gallstones, most consistent with acute cholecystitis    History of Present Illness: 44-year-old male patient who presented to the emergency department with severe epigastric pain radiating bilaterally to the subcostal regions earlier today.  He had a similar episode 2 days previously that resolved some extent.  When the pain returned today, he decided to proceed to the emergency department.  He has a past surgical history of anti-reflux surgery, which I presumed was a Nissen fundoplication performed laparoscopically, based on what he in the family tell me about where his incisions are.  In the emergency department, the patient had a CT scan which was fairly unrevealing, so a gallbladder ultrasound was ordered revealing 2 stones in the neck of the gallbladder without definite gallbladder wall thickening.  Surgery was asked to    No current facility-administered medications on file prior to encounter.     Current Outpatient Medications on File Prior to Encounter   Medication Sig    dextroamphetamine-amphetamine (ADDERALL XR) 15 MG 24 hr capsule Take 15 mg by mouth 4 (four) times daily.    lisinopriL (PRINIVIL,ZESTRIL) 20 MG tablet Take 1 tablet (20 mg total) by mouth once daily.    promethazine (PHENERGAN) 25 MG tablet Take 1 tablet (25 mg total) by mouth every 6 (six) hours as needed for Nausea. (Patient not taking: No sig reported)       Review of patient's allergies indicates:  No Known Allergies    Past Medical History:   Diagnosis Date    ADHD (attention deficit hyperactivity disorder)     Hypertension      No past surgical  history on file.  Family History    None       Tobacco Use    Smoking status: Former    Smokeless tobacco: Never   Substance and Sexual Activity    Alcohol use: Not Currently    Drug use: Not on file    Sexual activity: Not on file     Review of Systems   All other systems reviewed and are negative.    Objective:     Vital Signs (Most Recent):  Temp: 97.5 °F (36.4 °C) (03/26/24 1412)  Pulse: 93 (03/26/24 2002)  Resp: 15 (03/26/24 2002)  BP: (!) 188/92 (03/26/24 2002)  SpO2: 97 % (03/26/24 2002) Vital Signs (24h Range):  Temp:  [97.5 °F (36.4 °C)] 97.5 °F (36.4 °C)  Pulse:  [] 93  Resp:  [8-24] 15  SpO2:  [91 %-100 %] 97 %  BP: (180-252)/() 188/92     Weight: 132.9 kg (293 lb)  Body mass index is 43.27 kg/m².     Physical Exam  Cardiovascular:      Rate and Rhythm: Normal rate.   Pulmonary:      Effort: Pulmonary effort is normal.   Abdominal:      Palpations: Abdomen is soft.      Tenderness: There is abdominal tenderness (Epigastric and right upper quadrant).   Skin:     Coloration: Skin is not jaundiced.   Neurological:      General: No focal deficit present.      Mental Status: He is alert.   Psychiatric:         Mood and Affect: Mood normal.            I have reviewed all pertinent lab results within the past 24 hours.  CBC:   Recent Labs   Lab 03/26/24  1429   WBC 11.73*   RBC 5.66   HGB 15.1   HCT 47.4      MCV 83.7   MCH 26.7*   MCHC 31.9*     BMP:   Recent Labs   Lab 03/26/24  1429   *      K 3.7      CO2 28   BUN 21*   CREATININE 1.27   CALCIUM 9.9     CMP:   Recent Labs   Lab 03/26/24  1429   *   CALCIUM 9.9   ALBUMIN 3.9   PROT 8.6*      K 3.7   CO2 28      BUN 21*   CREATININE 1.27   ALKPHOS 89   ALT 37   AST 12*   BILITOT 0.4       Significant Diagnostics:  I have reviewed all pertinent imaging results/findings within the past 24 hours.  CT: I have reviewed all pertinent results/findings within the past 24 hours and my personal findings are:  Per  HPI  U/S: I have reviewed all pertinent results/findings within the past 24 hours and my personal findings are:  Per HPI    Assessment/Plan:     Cholelithiasis with acute cholecystitis  Patient will be admitted tonight for IV hydration and antibiotics, then proceed to the operating room when 1 available in the morning.      Risks and benefits of surgery discussed to include infection, bleeding, hernia, possible drain, duct injury, retained stones, open conversion, possible need for postop ERCP or further surgery, and unforeseen.  Patient expresses understanding and wishes to proceed.       VTE Risk Mitigation (From admission, onward)      None            Misha Tejada MD  General Surgery  [facility] Emergency Department

## 2024-03-28 ENCOUNTER — TELEPHONE (OUTPATIENT)
Dept: SURGERY | Facility: CLINIC | Age: 44
End: 2024-03-28

## 2024-03-28 LAB
ESTROGEN SERPL-MCNC: NORMAL PG/ML
INSULIN SERPL-ACNC: NORMAL U[IU]/ML
LAB AP GROSS DESCRIPTION: NORMAL
LAB AP LABORATORY NOTES: NORMAL
T3RU NFR SERPL: NORMAL %

## 2024-03-28 RX ORDER — HYDROCODONE BITARTRATE AND ACETAMINOPHEN 10; 325 MG/1; MG/1
1 TABLET ORAL EVERY 8 HOURS PRN
Qty: 12 TABLET | Refills: 0 | Status: SHIPPED | OUTPATIENT
Start: 2024-03-28

## 2024-03-28 NOTE — HOSPITAL COURSE
44-year-old male who has met maximum benefit of hospitalization after undergoing cholecystectomy after diagnosed with cholelithiasis with acute cholecystitis.  Hospitalization uncomplicated and at discharge patient was tolerating oral intake and pain controlled.  The patient is to follow up with General surgery as scheduled

## 2024-03-28 NOTE — DISCHARGE SUMMARY
[facility]  General Surgery  Discharge Summary      Patient Name: Jadon Hussein  MRN: [id number]  Admission Date: 3/26/2024  Hospital Length of Stay: 0 days  Discharge Date and Time: 3/27/2024  6:18 PM  Attending Physician: Yenny att. providers found   Discharging Provider: REZA Johns  Primary Care Provider: Yenny Primary Doctor    HPI:   44-year-old male patient who presented to the emergency department with severe epigastric pain radiating bilaterally to the subcostal regions earlier today.  He had a similar episode 2 days previously that resolved some extent.  When the pain returned today, he decided to proceed to the emergency department.  He has a past surgical history of anti-reflux surgery, which I presumed was a Nissen fundoplication performed laparoscopically, based on what he in the family tell me about where his incisions are.  In the emergency department, the patient had a CT scan which was fairly unrevealing, so a gallbladder ultrasound was ordered revealing 2 stones in the neck of the gallbladder without definite gallbladder wall thickening.  Surgery was asked to    Procedure(s) (LRB):  CHOLECYSTECTOMY, LAPAROSCOPIC (N/A)      Indwelling Lines/Drains at time of discharge:   Lines/Drains/Airways       None                 Hospital Course: 44-year-old male who has met maximum benefit of hospitalization after undergoing cholecystectomy after diagnosed with cholelithiasis with acute cholecystitis.  Hospitalization uncomplicated and at discharge patient was tolerating oral intake and pain controlled.  The patient is to follow up with General surgery as scheduled    Goals of Care Treatment Preferences:  Code Status: Full Code      Consults:     Significant Diagnostic Studies: Labs: All labs within the past 24 hours have been reviewed    Pending Diagnostic Studies:       None          Final Active Diagnoses:    Diagnosis Date Noted POA    Hard to intubate [T88.4XXA] 03/27/2024  Yes      Problems Resolved During this Admission:    Diagnosis Date Noted Date Resolved POA    PRINCIPAL PROBLEM:  Cholelithiasis with acute cholecystitis [K80.00] 03/26/2024 03/27/2024 Yes      Discharged Condition: fair    Disposition: Home or Self Care    Follow Up:   Follow-up Information       Tejada, Misha RHODES MD Follow up in 2 week(s).    Specialties: General Surgery, Surgery  Why: Status post cholecystectomy  Please follow up on April 9 at 10:30  Contact information:  [address] [place] [id number]  [phone]                           Patient Instructions:      Ambulatory referral[facility]consult to [facility]   Standing Status: Future   Referral Priority: Routine Referral Type: Consultation   Referral Reason: Specialty Services Required   Referred to Provider: LEXIS JENNINGS   Number of Visits Requested: 1     Diet Cardiac     Lifting restrictions   Order Comments: No heavy lifting or bending over at the waist     Notify your health care provider if you experience any of the following:  temperature >100.4     Notify your health care provider if you experience any of the following:  persistent nausea and vomiting or diarrhea     Notify your health care provider if you experience any of the following:  severe uncontrolled pain     Notify your health care provider if you experience any of the following:  redness, tenderness, or signs of infection (pain, swelling, redness, odor or green/yellow discharge around incision site)     Notify your health care provider if you experience any of the following:  difficulty breathing or increased cough     Notify your health care provider if you experience any of the following:  severe persistent headache     Notify your health care provider if you experience any of the following:  worsening rash     Notify your health care provider if you experience any of the following:  persistent dizziness, light-headedness, or visual disturbances     Notify your health care  provider if you experience any of the following:  increased confusion or weakness     Notify your health care provider if you experience any of the following:     No dressing needed   Order Comments: Leave Steri-Strips in place until they fall off or follow up appointment     Activity as tolerated     Medications:  Reconciled Home Medications:      Medication List        CONTINUE taking these medications      lisinopriL 20 MG tablet  Commonly known as: PRINIVIL,ZESTRIL  Take 1 tablet (20 mg total) by mouth once daily.            Time spent on the discharge of patient: <30 minutes    REZA Johns  General Surgery  [facility]

## 2024-03-28 NOTE — PLAN OF CARE
[facility]  Discharge Final Note    Primary Care Provider: No, Primary Doctor    Expected Discharge Date: 3/27/2024    Final Discharge Note (most recent)       Final Note - 03/28/24 0922          Final Note    Assessment Type Final Discharge Note     Anticipated Discharge Disposition Home or Self Care     What phone number can be called within the next 1-3 days to see how you are doing after discharge? [phone]        Post-Acute Status    Discharge Delays None known at this time                       Contact Info       Terrell, Misha RHODES MD   Specialty: General Surgery, Surgery    [address] [place] [id number]   Phone: [phone]       Next Steps: Follow up in 2 week(s)    Instructions: Status post cholecystectomy  Please follow up on April 9 at 10:30          Pt discharged home on yesterday. No needs.

## 2024-03-29 LAB — MICROORGANISM SPEC CULT: NORMAL

## 2024-03-31 LAB — BACTERIA SPEC ANAEROBE CULT: NORMAL

## 2024-04-16 ENCOUNTER — OFFICE VISIT (OUTPATIENT)
Dept: SURGERY | Facility: CLINIC | Age: 44
End: 2024-04-16
Attending: SURGERY

## 2024-04-16 DIAGNOSIS — R53.83 FATIGUE, UNSPECIFIED TYPE: ICD-10-CM

## 2024-04-16 DIAGNOSIS — I10 PRIMARY HYPERTENSION: Primary | ICD-10-CM

## 2024-04-16 DIAGNOSIS — Z09 POSTOP CHECK: ICD-10-CM

## 2024-04-16 PROCEDURE — 99024 POSTOP FOLLOW-UP VISIT: CPT | Mod: ,,, | Performed by: SURGERY

## 2024-04-16 PROCEDURE — 99213 OFFICE O/P EST LOW 20 MIN: CPT | Mod: PBBFAC | Performed by: SURGERY

## 2024-04-19 LAB
OHS QRS DURATION: 104 MS
OHS QTC CALCULATION: 451 MS

## 2024-04-21 PROBLEM — Z09 POSTOP CHECK: Status: ACTIVE | Noted: 2024-04-21

## 2024-04-22 NOTE — PROGRESS NOTES
Status post lap godfrey.  Patient had significant inflammatory changes, and it was a difficult operation.  He now reports occasional loose stools.  His postoperative pain is resolving.  The patient would like an appointment for measure of his hypertension, and is interested in a sleep study evaluation as he was told he likely has sleep apnea.  We are arranging for appointments with family medicine so that he can have both of the stones ordered.  Follow up with me as PRN.

## 2024-04-23 DIAGNOSIS — I10 PRIMARY HYPERTENSION: ICD-10-CM

## 2024-06-25 RX ORDER — LISINOPRIL 20 MG/1
TABLET ORAL
Qty: 30 TABLET | Refills: 0 | Status: SHIPPED | OUTPATIENT
Start: 2024-06-25

## (undated) DEVICE — STRIP MEDI WND CLSR 1/2X4IN

## (undated) DEVICE — TROCAR KII BLLN 12MM 10CM

## (undated) DEVICE — COLLECTOR SPECIMEN ANAEROBIC

## (undated) DEVICE — APPLIER CLIP ENDO LIGAMAX 5MM

## (undated) DEVICE — SUT PDS II O CT-2 VIL MONO

## (undated) DEVICE — WARMER BLUE HEAT SCOPE 3-12MM

## (undated) DEVICE — GOWN NONREINF SET-IN SLV 2XL

## (undated) DEVICE — CORD LAP 10 DISP

## (undated) DEVICE — KIT PROCEDURE STER INLET CLOSU

## (undated) DEVICE — GOWN POLY REINF BRTH SLV XL

## (undated) DEVICE — TROCAR ENDO Z THREAD KII 5X100

## (undated) DEVICE — BAG TISS RETRV MONARCH 10MM

## (undated) DEVICE — GLOVE SENSICARE PI SURG 6.5

## (undated) DEVICE — TROCAR KII FIOS ZTHREAD 11X100

## (undated) DEVICE — IRRIGATOR ENDOSCOPY DISP.

## (undated) DEVICE — ELECTRODE LAPSCP L HOOK 36CM

## (undated) DEVICE — SOL NACL IRR 1000ML BTL

## (undated) DEVICE — GLOVE SENSICARE PI SURG 7.5

## (undated) DEVICE — SUT 4-0 VICRYL / FS-2

## (undated) DEVICE — SWAB AEROBIC CULTURETTE

## (undated) DEVICE — GLOVE 7.5 PROTEXIS PI BLUE

## (undated) DEVICE — SOL NACL IRR 3000ML

## (undated) DEVICE — Device

## (undated) DEVICE — CART INSERT SCISSOR F 5X34CM

## (undated) DEVICE — GLOVE PROTEXIS PI SYN SURG 8.0

## (undated) DEVICE — GLOVE SENSICARE PI GRN 6.5

## (undated) DEVICE — GLOVE 8 PROTEXIS PI BLUE

## (undated) DEVICE — GLOVE SENSICARE PI SURG 6

## (undated) DEVICE — CANNULA LAP SEAL Z THRD 5X100